# Patient Record
Sex: FEMALE | Race: WHITE
[De-identification: names, ages, dates, MRNs, and addresses within clinical notes are randomized per-mention and may not be internally consistent; named-entity substitution may affect disease eponyms.]

---

## 2019-11-06 ENCOUNTER — HOSPITAL ENCOUNTER (INPATIENT)
Dept: HOSPITAL 12 - ER | Age: 69
LOS: 3 days | Discharge: NURSING, REHAB OR LONG-TERM CARE | DRG: 872 | End: 2019-11-09
Attending: INTERNAL MEDICINE
Payer: MEDICAID

## 2019-11-06 VITALS — HEIGHT: 62 IN | WEIGHT: 175.31 LBS | BODY MASS INDEX: 32.26 KG/M2

## 2019-11-06 DIAGNOSIS — E78.5: ICD-10-CM

## 2019-11-06 DIAGNOSIS — R11.2: ICD-10-CM

## 2019-11-06 DIAGNOSIS — I70.0: ICD-10-CM

## 2019-11-06 DIAGNOSIS — G89.29: ICD-10-CM

## 2019-11-06 DIAGNOSIS — E11.65: ICD-10-CM

## 2019-11-06 DIAGNOSIS — E11.51: ICD-10-CM

## 2019-11-06 DIAGNOSIS — Z79.82: ICD-10-CM

## 2019-11-06 DIAGNOSIS — Z89.611: ICD-10-CM

## 2019-11-06 DIAGNOSIS — I10: ICD-10-CM

## 2019-11-06 DIAGNOSIS — E11.622: ICD-10-CM

## 2019-11-06 DIAGNOSIS — J44.9: ICD-10-CM

## 2019-11-06 DIAGNOSIS — E66.9: ICD-10-CM

## 2019-11-06 DIAGNOSIS — L97.821: ICD-10-CM

## 2019-11-06 DIAGNOSIS — Z86.19: ICD-10-CM

## 2019-11-06 DIAGNOSIS — E11.40: ICD-10-CM

## 2019-11-06 DIAGNOSIS — A41.9: Primary | ICD-10-CM

## 2019-11-06 DIAGNOSIS — F17.210: ICD-10-CM

## 2019-11-06 DIAGNOSIS — E44.0: ICD-10-CM

## 2019-11-06 DIAGNOSIS — F41.9: ICD-10-CM

## 2019-11-06 DIAGNOSIS — J98.11: ICD-10-CM

## 2019-11-06 DIAGNOSIS — Z79.899: ICD-10-CM

## 2019-11-06 DIAGNOSIS — A04.72: ICD-10-CM

## 2019-11-06 DIAGNOSIS — D68.59: ICD-10-CM

## 2019-11-06 DIAGNOSIS — M54.9: ICD-10-CM

## 2019-11-06 DIAGNOSIS — Z74.09: ICD-10-CM

## 2019-11-06 DIAGNOSIS — Z79.4: ICD-10-CM

## 2019-11-06 LAB
ALP SERPL-CCNC: 87 U/L (ref 50–136)
ALT SERPL W/O P-5'-P-CCNC: 9 U/L (ref 14–59)
AST SERPL-CCNC: 5 U/L (ref 15–37)
BASOPHILS # BLD AUTO: 0.1 K/UL (ref 0–8)
BASOPHILS NFR BLD AUTO: 0.4 % (ref 0–2)
BILIRUB DIRECT SERPL-MCNC: 0.1 MG/DL (ref 0–0.2)
BILIRUB SERPL-MCNC: 0.5 MG/DL (ref 0.2–1)
CHLORIDE SERPL-SCNC: 96 MMOL/L (ref 98–107)
CO2 SERPL-SCNC: 31 MMOL/L (ref 21–32)
CREAT SERPL-MCNC: 0.8 MG/DL (ref 0.6–1.3)
EOSINOPHIL # BLD AUTO: 0.1 K/UL (ref 0–0.7)
EOSINOPHIL NFR BLD AUTO: 0.9 % (ref 0–7)
GLUCOSE SERPL-MCNC: 215 MG/DL (ref 74–106)
HCT VFR BLD AUTO: 38.8 % (ref 31.2–41.9)
HGB BLD-MCNC: 12.7 G/DL (ref 10.9–14.3)
LIPASE SERPL-CCNC: 25 U/L (ref 73–393)
LYMPHOCYTES # BLD AUTO: 1.2 K/UL (ref 20–40)
LYMPHOCYTES NFR BLD AUTO: 10.9 % (ref 20.5–51.5)
MCH RBC QN AUTO: 33.9 UUG (ref 24.7–32.8)
MCHC RBC AUTO-ENTMCNC: 33 G/DL (ref 32.3–35.6)
MCV RBC AUTO: 103.7 FL (ref 75.5–95.3)
MONOCYTES # BLD AUTO: 0.6 K/UL (ref 2–10)
MONOCYTES NFR BLD AUTO: 5.2 % (ref 0–11)
NEUTROPHILS # BLD AUTO: 9.3 K/UL (ref 1.8–8.9)
NEUTROPHILS NFR BLD AUTO: 82.6 % (ref 38.5–71.5)
PLATELET # BLD AUTO: 278 K/UL (ref 179–408)
POTASSIUM SERPL-SCNC: 4.3 MMOL/L (ref 3.5–5.1)
RBC # BLD AUTO: 3.74 MIL/UL (ref 3.63–4.92)
WBC # BLD AUTO: 11.3 K/UL (ref 3.8–11.8)
WS STN SPEC: 7.3 G/DL (ref 6.4–8.2)

## 2019-11-06 PROCEDURE — G0378 HOSPITAL OBSERVATION PER HR: HCPCS

## 2019-11-06 PROCEDURE — A4663 DIALYSIS BLOOD PRESSURE CUFF: HCPCS

## 2019-11-07 VITALS — DIASTOLIC BLOOD PRESSURE: 52 MMHG | SYSTOLIC BLOOD PRESSURE: 115 MMHG

## 2019-11-07 VITALS — DIASTOLIC BLOOD PRESSURE: 61 MMHG | SYSTOLIC BLOOD PRESSURE: 142 MMHG

## 2019-11-07 VITALS — SYSTOLIC BLOOD PRESSURE: 139 MMHG | DIASTOLIC BLOOD PRESSURE: 69 MMHG

## 2019-11-07 VITALS — SYSTOLIC BLOOD PRESSURE: 120 MMHG | DIASTOLIC BLOOD PRESSURE: 52 MMHG

## 2019-11-07 VITALS — DIASTOLIC BLOOD PRESSURE: 61 MMHG | SYSTOLIC BLOOD PRESSURE: 133 MMHG

## 2019-11-07 LAB
ALP SERPL-CCNC: 73 U/L (ref 50–136)
ALT SERPL W/O P-5'-P-CCNC: < 6 U/L (ref 14–59)
AST SERPL-CCNC: < 5 U/L (ref 15–37)
BASOPHILS # BLD AUTO: 0 K/UL (ref 0–8)
BASOPHILS NFR BLD AUTO: 0.3 % (ref 0–2)
BILIRUB SERPL-MCNC: 0.5 MG/DL (ref 0.2–1)
BUN SERPL-MCNC: 11 MG/DL (ref 7–18)
BUN SERPL-MCNC: 12 MG/DL (ref 7–18)
CHLORIDE SERPL-SCNC: 98 MMOL/L (ref 98–107)
CHOLEST SERPL-MCNC: 130 MG/DL (ref ?–200)
CO2 SERPL-SCNC: 32 MMOL/L (ref 21–32)
CREAT SERPL-MCNC: 0.8 MG/DL (ref 0.6–1.3)
EOSINOPHIL # BLD AUTO: 0.1 K/UL (ref 0–0.7)
EOSINOPHIL NFR BLD AUTO: 0.8 % (ref 0–7)
GLUCOSE SERPL-MCNC: 249 MG/DL (ref 74–106)
HCT VFR BLD AUTO: 37.6 % (ref 31.2–41.9)
HDLC SERPL-MCNC: 45 MG/DL (ref 40–60)
HGB BLD-MCNC: 12.6 G/DL (ref 10.9–14.3)
LYMPHOCYTES # BLD AUTO: 1 K/UL (ref 20–40)
LYMPHOCYTES NFR BLD AUTO: 10.6 % (ref 20.5–51.5)
MAGNESIUM SERPL-MCNC: 1.5 MG/DL (ref 1.8–2.4)
MCH RBC QN AUTO: 34.2 UUG (ref 24.7–32.8)
MCHC RBC AUTO-ENTMCNC: 33 G/DL (ref 32.3–35.6)
MCV RBC AUTO: 102.4 FL (ref 75.5–95.3)
MONOCYTES # BLD AUTO: 0.6 K/UL (ref 2–10)
MONOCYTES NFR BLD AUTO: 6.4 % (ref 0–11)
NEUTROPHILS # BLD AUTO: 8 K/UL (ref 1.8–8.9)
NEUTROPHILS NFR BLD AUTO: 81.9 % (ref 38.5–71.5)
PHOSPHATE SERPL-MCNC: 4.4 MG/DL (ref 2.5–4.9)
PLATELET # BLD AUTO: 297 K/UL (ref 179–408)
POTASSIUM SERPL-SCNC: 4 MMOL/L (ref 3.5–5.1)
RBC # BLD AUTO: 3.67 MIL/UL (ref 3.63–4.92)
TRIGL SERPL-MCNC: 223 MG/DL (ref 30–150)
TSH SERPL DL<=0.005 MIU/L-ACNC: 1.42 MIU/ML (ref 0.36–3.74)
WBC # BLD AUTO: 9.8 K/UL (ref 3.8–11.8)
WS STN SPEC: 6.8 G/DL (ref 6.4–8.2)

## 2019-11-07 RX ADMIN — LACTOBACILLUS ACIDOPH-L.BULGARICUS 1 MILLION CELL CHEWABLE TABLET SCH TAB.CHEW: at 08:22

## 2019-11-07 RX ADMIN — SODIUM CHLORIDE PRN UNIT: 9 INJECTION, SOLUTION INTRAVENOUS at 21:46

## 2019-11-07 RX ADMIN — GABAPENTIN SCH MG: 300 CAPSULE ORAL at 08:22

## 2019-11-07 RX ADMIN — SODIUM CHLORIDE PRN UNIT: 9 INJECTION, SOLUTION INTRAVENOUS at 17:31

## 2019-11-07 RX ADMIN — ASPIRIN SCH MG: 81 TABLET, CHEWABLE ORAL at 08:22

## 2019-11-07 RX ADMIN — DEXTROSE AND SODIUM CHLORIDE PRN MLS/HR: 5; .45 INJECTION, SOLUTION INTRAVENOUS at 02:16

## 2019-11-07 RX ADMIN — MAGNESIUM SULFATE IN DEXTROSE SCH MLS/HR: 10 INJECTION, SOLUTION INTRAVENOUS at 12:09

## 2019-11-07 RX ADMIN — Medication SCH MG: at 10:48

## 2019-11-07 RX ADMIN — OXYCODONE HYDROCHLORIDE AND ACETAMINOPHEN SCH MG: 500 TABLET ORAL at 08:22

## 2019-11-07 RX ADMIN — SODIUM CHLORIDE PRN UNIT: 9 INJECTION, SOLUTION INTRAVENOUS at 08:24

## 2019-11-07 RX ADMIN — FOLIC ACID SCH MG: 1 TABLET ORAL at 08:22

## 2019-11-07 RX ADMIN — Medication SCH EACH: at 11:36

## 2019-11-07 RX ADMIN — VANCOMYCIN HYDROCHLORIDE SCH MG: 500 INJECTION, POWDER, LYOPHILIZED, FOR SOLUTION INTRAVENOUS at 18:10

## 2019-11-07 RX ADMIN — Medication SCH CAP: at 17:29

## 2019-11-07 RX ADMIN — METRONIDAZOLE SCH MLS/HR: 500 SOLUTION INTRAVENOUS at 06:28

## 2019-11-07 RX ADMIN — LACTOBACILLUS ACIDOPH-L.BULGARICUS 1 MILLION CELL CHEWABLE TABLET SCH TAB.CHEW: at 17:29

## 2019-11-07 RX ADMIN — MONTELUKAST SCH MG: 10 TABLET, FILM COATED ORAL at 17:29

## 2019-11-07 RX ADMIN — METRONIDAZOLE SCH MLS/HR: 500 SOLUTION INTRAVENOUS at 22:20

## 2019-11-07 RX ADMIN — ENOXAPARIN SODIUM SCH MG: 40 INJECTION SUBCUTANEOUS at 21:46

## 2019-11-07 RX ADMIN — DEXTROSE AND SODIUM CHLORIDE PRN MLS/HR: 5; .45 INJECTION, SOLUTION INTRAVENOUS at 19:40

## 2019-11-07 RX ADMIN — ATORVASTATIN CALCIUM SCH MG: 20 TABLET, FILM COATED ORAL at 21:44

## 2019-11-07 RX ADMIN — Medication SCH EACH: at 16:32

## 2019-11-07 RX ADMIN — MAGNESIUM SULFATE IN DEXTROSE SCH MLS/HR: 10 INJECTION, SOLUTION INTRAVENOUS at 13:31

## 2019-11-07 RX ADMIN — GABAPENTIN SCH MG: 300 CAPSULE ORAL at 12:18

## 2019-11-07 RX ADMIN — THERA TABS SCH UDTAB: TAB at 08:22

## 2019-11-07 RX ADMIN — Medication SCH EACH: at 21:47

## 2019-11-07 RX ADMIN — Medication SCH EACH: at 06:48

## 2019-11-07 RX ADMIN — GABAPENTIN SCH MG: 300 CAPSULE ORAL at 17:29

## 2019-11-07 RX ADMIN — METRONIDAZOLE SCH MLS/HR: 500 SOLUTION INTRAVENOUS at 14:52

## 2019-11-07 RX ADMIN — Medication SCH CAP: at 10:49

## 2019-11-07 RX ADMIN — PANTOPRAZOLE SODIUM SCH MG: 40 TABLET, DELAYED RELEASE ORAL at 06:34

## 2019-11-07 RX ADMIN — SODIUM CHLORIDE PRN UNIT: 9 INJECTION, SOLUTION INTRAVENOUS at 12:08

## 2019-11-07 RX ADMIN — DIVALPROEX SODIUM SCH MG: 250 TABLET, EXTENDED RELEASE ORAL at 21:44

## 2019-11-07 RX ADMIN — VANCOMYCIN HYDROCHLORIDE SCH MG: 500 INJECTION, POWDER, LYOPHILIZED, FOR SOLUTION INTRAVENOUS at 23:36

## 2019-11-07 NOTE — NUR
PATIENT RESTING IN BED. NO ACUTE DISTRESS NOTED. PATIENT DENIES PAIN AND DISCOMFORT. BED IN 
LOWEST POSITION, SIDE RAILS UP X2, CALL LIGHT WITHIN REACH. WILL CONTINUE TO MONITOR.

## 2019-11-07 NOTE — NUR
Patient needs frequent redirection, no distress noted. No vomiting noted. Patient states "I 
don't feel good" but is not able to specify what is making her feel bad. ordered flu 
vaccine. IVF running on the left AC, no s/s of infection or infiltration noted. Will endorse 
to next shift.

## 2019-11-07 NOTE — NUR
Received patient from ER. Dx: Abdominal pain, N/V, Diarrhea. No signs of acute distress 
noted. Patient is A/Ox2. Dozing off during assessment, but easily to arouse. Patient stated 
she is just tired. Patient was given a bed bath, stool sample collected. IV on the left AC 
is intact and patent. Patient noted with sacral wound and wound on the left lower extremity. 
Pictures in chart. Safety measures initiated. Bed is low and locked, call light within 
reach. Will continue with the admission process.

## 2019-11-07 NOTE — NUR
PT ASLEEP BUT EASILY ROUSED

PT WILL BE ADMITTED TO TELE  

UNDER DR MELLO

DX: NAUSEA /VOMITTING





ALL BELONGINGS W/ PT

BELONGINGS LIST SIGNED/COSIGNED

## 2019-11-07 NOTE — NUR
did not administer protonix as patient is NPO, also patient has been complaining of nausea 
so did not want her to vomit.

## 2019-11-07 NOTE — NUR
TRANSPORTED TO [facility] ROOM 302

[facility] 



[facility]

BED AT LOWEST POSITION

NAD

## 2019-11-07 NOTE — NUR
PATIENT RESTED INTERMITTENTLY THROUGHOUT DAY. PATIENT EXTREMELY CONFUSED. MAGNESIUM 
REPLACED. IV ANTIBIOTICS ADMINISTERED. PATIENT TESTED POSITIVE FOR CDIFF. PATIENT ON CONTACT 
ISOLATION. PATIENT PLACED ON VANCOMYCIN. SAFETY MEASURES  PROVIDED.

## 2019-11-07 NOTE — NUR
Patient received into care, sleeping in bed, resting comfortably.  IV cath 20g in left a/c 
is patent and intact with D5 1/2 NS running at 80ml/hr.  Patient has no s/s of acute 
distress or discomfort noted or observed.  All safety and fall precaution measures are in 
place.  Call light and personal items are within reach at all times.  Will continue to 
monitor.

## 2019-11-08 VITALS — DIASTOLIC BLOOD PRESSURE: 61 MMHG | SYSTOLIC BLOOD PRESSURE: 138 MMHG

## 2019-11-08 VITALS — SYSTOLIC BLOOD PRESSURE: 147 MMHG | DIASTOLIC BLOOD PRESSURE: 70 MMHG

## 2019-11-08 VITALS — DIASTOLIC BLOOD PRESSURE: 89 MMHG | SYSTOLIC BLOOD PRESSURE: 133 MMHG

## 2019-11-08 VITALS — DIASTOLIC BLOOD PRESSURE: 59 MMHG | SYSTOLIC BLOOD PRESSURE: 124 MMHG

## 2019-11-08 VITALS — DIASTOLIC BLOOD PRESSURE: 63 MMHG | SYSTOLIC BLOOD PRESSURE: 135 MMHG

## 2019-11-08 LAB
ALP SERPL-CCNC: 83 U/L (ref 50–136)
ALT SERPL W/O P-5'-P-CCNC: 10 U/L (ref 14–59)
AST SERPL-CCNC: 5 U/L (ref 15–37)
BASOPHILS # BLD AUTO: 0 K/UL (ref 0–8)
BASOPHILS NFR BLD AUTO: 0.3 % (ref 0–2)
BILIRUB SERPL-MCNC: 0.4 MG/DL (ref 0.2–1)
BUN SERPL-MCNC: 9 MG/DL (ref 7–18)
CHLORIDE SERPL-SCNC: 102 MMOL/L (ref 98–107)
CO2 SERPL-SCNC: 31 MMOL/L (ref 21–32)
CREAT SERPL-MCNC: 0.7 MG/DL (ref 0.6–1.3)
EOSINOPHIL # BLD AUTO: 0.1 K/UL (ref 0–0.7)
EOSINOPHIL NFR BLD AUTO: 2.2 % (ref 0–7)
GLUCOSE SERPL-MCNC: 237 MG/DL (ref 74–106)
HCT VFR BLD AUTO: 36.2 % (ref 31.2–41.9)
HGB BLD-MCNC: 11.9 G/DL (ref 10.9–14.3)
LYMPHOCYTES # BLD AUTO: 1 K/UL (ref 20–40)
LYMPHOCYTES NFR BLD AUTO: 17.5 % (ref 20.5–51.5)
MAGNESIUM SERPL-MCNC: 1.8 MG/DL (ref 1.8–2.4)
MCH RBC QN AUTO: 34.3 UUG (ref 24.7–32.8)
MCHC RBC AUTO-ENTMCNC: 33 G/DL (ref 32.3–35.6)
MCV RBC AUTO: 104.3 FL (ref 75.5–95.3)
MONOCYTES # BLD AUTO: 0.2 K/UL (ref 2–10)
MONOCYTES NFR BLD AUTO: 4.4 % (ref 0–11)
NEUTROPHILS # BLD AUTO: 4.3 K/UL (ref 1.8–8.9)
NEUTROPHILS NFR BLD AUTO: 75.6 % (ref 38.5–71.5)
PHOSPHATE SERPL-MCNC: 2.8 MG/DL (ref 2.5–4.9)
PLATELET # BLD AUTO: 261 K/UL (ref 179–408)
POTASSIUM SERPL-SCNC: 3.4 MMOL/L (ref 3.5–5.1)
RBC # BLD AUTO: 3.47 MIL/UL (ref 3.63–4.92)
WBC # BLD AUTO: 5.7 K/UL (ref 3.8–11.8)
WS STN SPEC: 6.8 G/DL (ref 6.4–8.2)

## 2019-11-08 RX ADMIN — Medication SCH CAP: at 17:00

## 2019-11-08 RX ADMIN — VANCOMYCIN HYDROCHLORIDE SCH MG: 500 INJECTION, POWDER, LYOPHILIZED, FOR SOLUTION INTRAVENOUS at 05:39

## 2019-11-08 RX ADMIN — Medication SCH MG: at 08:13

## 2019-11-08 RX ADMIN — METRONIDAZOLE SCH MLS/HR: 500 SOLUTION INTRAVENOUS at 22:38

## 2019-11-08 RX ADMIN — GABAPENTIN SCH MG: 300 CAPSULE ORAL at 17:15

## 2019-11-08 RX ADMIN — SODIUM CHLORIDE PRN UNIT: 9 INJECTION, SOLUTION INTRAVENOUS at 21:05

## 2019-11-08 RX ADMIN — Medication SCH EACH: at 16:57

## 2019-11-08 RX ADMIN — HYDROCODONE BITARTRATE AND ACETAMINOPHEN PRN TAB: 5; 325 TABLET ORAL at 19:13

## 2019-11-08 RX ADMIN — VANCOMYCIN HYDROCHLORIDE SCH MG: 500 INJECTION, POWDER, LYOPHILIZED, FOR SOLUTION INTRAVENOUS at 12:09

## 2019-11-08 RX ADMIN — THERA TABS SCH UDTAB: TAB at 08:13

## 2019-11-08 RX ADMIN — METRONIDAZOLE SCH MLS/HR: 500 SOLUTION INTRAVENOUS at 13:59

## 2019-11-08 RX ADMIN — Medication SCH EACH: at 05:34

## 2019-11-08 RX ADMIN — ASPIRIN SCH MG: 81 TABLET, CHEWABLE ORAL at 08:13

## 2019-11-08 RX ADMIN — ENOXAPARIN SODIUM SCH MG: 40 INJECTION SUBCUTANEOUS at 21:02

## 2019-11-08 RX ADMIN — Medication SCH EACH: at 20:48

## 2019-11-08 RX ADMIN — Medication SCH CAP: at 10:36

## 2019-11-08 RX ADMIN — SODIUM CHLORIDE PRN UNIT: 9 INJECTION, SOLUTION INTRAVENOUS at 08:17

## 2019-11-08 RX ADMIN — VANCOMYCIN HYDROCHLORIDE SCH MG: 500 INJECTION, POWDER, LYOPHILIZED, FOR SOLUTION INTRAVENOUS at 18:15

## 2019-11-08 RX ADMIN — METFORMIN HYDROCHLORIDE SCH MG: 500 TABLET ORAL at 17:16

## 2019-11-08 RX ADMIN — MONTELUKAST SCH MG: 10 TABLET, FILM COATED ORAL at 17:16

## 2019-11-08 RX ADMIN — METRONIDAZOLE SCH MLS/HR: 500 SOLUTION INTRAVENOUS at 05:21

## 2019-11-08 RX ADMIN — DEXTROSE AND SODIUM CHLORIDE PRN MLS/HR: 5; .45 INJECTION, SOLUTION INTRAVENOUS at 13:58

## 2019-11-08 RX ADMIN — ATORVASTATIN CALCIUM SCH MG: 20 TABLET, FILM COATED ORAL at 21:02

## 2019-11-08 RX ADMIN — FOLIC ACID SCH MG: 1 TABLET ORAL at 08:13

## 2019-11-08 RX ADMIN — OXYCODONE HYDROCHLORIDE AND ACETAMINOPHEN SCH MG: 500 TABLET ORAL at 08:13

## 2019-11-08 RX ADMIN — GABAPENTIN SCH MG: 300 CAPSULE ORAL at 08:13

## 2019-11-08 RX ADMIN — PANTOPRAZOLE SODIUM SCH MG: 40 TABLET, DELAYED RELEASE ORAL at 06:15

## 2019-11-08 RX ADMIN — LACTOBACILLUS ACIDOPH-L.BULGARICUS 1 MILLION CELL CHEWABLE TABLET SCH TAB.CHEW: at 17:15

## 2019-11-08 RX ADMIN — DIVALPROEX SODIUM SCH MG: 250 TABLET, EXTENDED RELEASE ORAL at 21:02

## 2019-11-08 RX ADMIN — LACTOBACILLUS ACIDOPH-L.BULGARICUS 1 MILLION CELL CHEWABLE TABLET SCH TAB.CHEW: at 08:13

## 2019-11-08 RX ADMIN — GABAPENTIN SCH MG: 300 CAPSULE ORAL at 12:09

## 2019-11-08 RX ADMIN — METFORMIN HYDROCHLORIDE SCH MG: 500 TABLET ORAL at 08:13

## 2019-11-08 RX ADMIN — Medication SCH EACH: at 12:12

## 2019-11-08 RX ADMIN — SODIUM CHLORIDE PRN UNIT: 9 INJECTION, SOLUTION INTRAVENOUS at 12:13

## 2019-11-08 NOTE — NUR
Patient slept intermittently throughout night with no complaints of pain or discomfort 
verbalized.  All prescribed medications were given as ordered and tolerated well, with no 
adverse side effects verbalized or observed.  All safety and fall precaution measures remain 
in place.  Call light and personal items are in place.

## 2019-11-08 NOTE — NUR
Prescribed PO Vanco 250 mg (0.5 oral suspension) not in pyxis.   Medication not available so 
not able to provide to patient.

## 2019-11-08 NOTE — NUR
WOUND CARE CONSULT: PT PRESENTS WITH INCONTINENCE OF LOOSE STOOL, SACRAL SCAR, LEFT LOWER 
LEG WOUND AND RT ABOVE KNEE AMPUTATION SITE WITH IRREGULAR SCARRING, SOME CRUSTING OF SCAR, 
ALL PRESENT ON ADMISSION. RECOMMEND DPM CONSULT FOR LEFT LOWER LEG WOUND. DR CURRY 
NOTIFIED OF DPM CONSULT REQUEST. RECOMMENDATIONS MADE FOR SKIN PROTECTION. DISCUSSED WITH 
NURSING STAFF. WILL SEE PRN. MD IN AGREEMENT WITH PLAN OF CARE. DEFER TO DPM FOR LOWER 
EXTREMITY. 

-------------------------------------------------------------------------------

Addendum: 11/08/19 at 1207 by SATNAM CUMMINGS RN

-------------------------------------------------------------------------------

Amended: Links added.

## 2019-11-08 NOTE — NUR
Prescribed PO Vanco 250 mg (0.5 oral suspension) not in pyxis.  Faxed order to house 
supervisor who advised this medication can only be filled by pharmacy.   Medication not 
available so not able to provide to patient.

## 2019-11-09 VITALS — DIASTOLIC BLOOD PRESSURE: 72 MMHG | SYSTOLIC BLOOD PRESSURE: 131 MMHG

## 2019-11-09 VITALS — SYSTOLIC BLOOD PRESSURE: 146 MMHG | DIASTOLIC BLOOD PRESSURE: 63 MMHG

## 2019-11-09 VITALS — DIASTOLIC BLOOD PRESSURE: 69 MMHG | SYSTOLIC BLOOD PRESSURE: 135 MMHG

## 2019-11-09 RX ADMIN — VANCOMYCIN HYDROCHLORIDE SCH MG: 500 INJECTION, POWDER, LYOPHILIZED, FOR SOLUTION INTRAVENOUS at 00:20

## 2019-11-09 RX ADMIN — SODIUM CHLORIDE PRN UNIT: 9 INJECTION, SOLUTION INTRAVENOUS at 11:59

## 2019-11-09 RX ADMIN — Medication SCH EACH: at 11:53

## 2019-11-09 RX ADMIN — Medication SCH MG: at 08:02

## 2019-11-09 RX ADMIN — Medication SCH CAP: at 08:01

## 2019-11-09 RX ADMIN — LACTOBACILLUS ACIDOPH-L.BULGARICUS 1 MILLION CELL CHEWABLE TABLET SCH TAB.CHEW: at 08:02

## 2019-11-09 RX ADMIN — METFORMIN HYDROCHLORIDE SCH MG: 500 TABLET ORAL at 17:16

## 2019-11-09 RX ADMIN — Medication SCH EACH: at 17:20

## 2019-11-09 RX ADMIN — FOLIC ACID SCH MG: 1 TABLET ORAL at 08:01

## 2019-11-09 RX ADMIN — Medication SCH CAP: at 17:16

## 2019-11-09 RX ADMIN — VANCOMYCIN HYDROCHLORIDE SCH MG: 500 INJECTION, POWDER, LYOPHILIZED, FOR SOLUTION INTRAVENOUS at 06:39

## 2019-11-09 RX ADMIN — Medication SCH EACH: at 06:53

## 2019-11-09 RX ADMIN — OXYCODONE HYDROCHLORIDE AND ACETAMINOPHEN SCH MG: 500 TABLET ORAL at 08:01

## 2019-11-09 RX ADMIN — THERA TABS SCH UDTAB: TAB at 08:00

## 2019-11-09 RX ADMIN — VANCOMYCIN HYDROCHLORIDE SCH MG: 500 INJECTION, POWDER, LYOPHILIZED, FOR SOLUTION INTRAVENOUS at 11:48

## 2019-11-09 RX ADMIN — METRONIDAZOLE SCH MLS/HR: 500 SOLUTION INTRAVENOUS at 05:06

## 2019-11-09 RX ADMIN — ASPIRIN SCH MG: 81 TABLET, CHEWABLE ORAL at 08:02

## 2019-11-09 RX ADMIN — GABAPENTIN SCH MG: 300 CAPSULE ORAL at 08:01

## 2019-11-09 RX ADMIN — GABAPENTIN SCH MG: 300 CAPSULE ORAL at 12:51

## 2019-11-09 RX ADMIN — VANCOMYCIN HYDROCHLORIDE SCH MG: 500 INJECTION, POWDER, LYOPHILIZED, FOR SOLUTION INTRAVENOUS at 18:00

## 2019-11-09 RX ADMIN — METFORMIN HYDROCHLORIDE SCH MG: 500 TABLET ORAL at 08:02

## 2019-11-09 RX ADMIN — MONTELUKAST SCH MG: 10 TABLET, FILM COATED ORAL at 17:16

## 2019-11-09 RX ADMIN — METRONIDAZOLE SCH MLS/HR: 500 SOLUTION INTRAVENOUS at 14:00

## 2019-11-09 RX ADMIN — GABAPENTIN SCH MG: 300 CAPSULE ORAL at 17:17

## 2019-11-09 RX ADMIN — SODIUM CHLORIDE PRN UNIT: 9 INJECTION, SOLUTION INTRAVENOUS at 08:07

## 2019-11-09 RX ADMIN — PANTOPRAZOLE SODIUM SCH MG: 40 TABLET, DELAYED RELEASE ORAL at 06:40

## 2019-11-09 RX ADMIN — LACTOBACILLUS ACIDOPH-L.BULGARICUS 1 MILLION CELL CHEWABLE TABLET SCH TAB.CHEW: at 17:16

## 2019-11-09 RX ADMIN — HYDROCODONE BITARTRATE AND ACETAMINOPHEN PRN TAB: 5; 325 TABLET ORAL at 09:53

## 2019-11-09 NOTE — NUR
Pt received this morning, AAOx3, liquid diet advanced to regular diabetic diet. Pt seen by 
Dr. Domingo. Isolation order removed. Pt approved for D/C this afternoon. Spoke with son 
regarding plan of care. Pt pulled out IV from right AC. IV antibiotics c/s at this time. All 
safety and comfort measures in place. Call light placed within reach. Will continue to 
monitor.

## 2023-08-30 NOTE — NUR
Discharge order received, paperwork completed, reviewed with Pt, signed and copies placed in 
chart. Skin integrity photos refused of small healing scab on left shin and sacral redness. 
VS stable. Pt complaint with routine medications except for insuolin coverage of  and 
DM PO medication. Pt denies pain, anxious for ambulance to arrive. Son and room mate both 
contacted regarding Pt discharge and transportation. Report called and given to Susan ORDOÑEZ. All 
discharge concerns addressed. Belongings accounted for and form signed. Pt safely 
transferred to [facility] and will be removed from system shortly. Calcipotriene Pregnancy And Lactation Text: The use of this medication during pregnancy or lactation is not recommended as there is insufficient data.